# Patient Record
Sex: MALE | Race: WHITE | URBAN - METROPOLITAN AREA
[De-identification: names, ages, dates, MRNs, and addresses within clinical notes are randomized per-mention and may not be internally consistent; named-entity substitution may affect disease eponyms.]

---

## 2021-08-05 ENCOUNTER — OFFICE VISIT (OUTPATIENT)
Dept: URBAN - METROPOLITAN AREA CLINIC 3 | Facility: CLINIC | Age: 56
End: 2021-08-05
Payer: COMMERCIAL

## 2021-08-05 DIAGNOSIS — H53.8 OTHER VISUAL DISTURBANCES: ICD-10-CM

## 2021-08-05 DIAGNOSIS — H25.813 COMBINED FORMS OF AGE-RELATED CATARACT, BILATERAL: ICD-10-CM

## 2021-08-05 DIAGNOSIS — S06.2X9A: ICD-10-CM

## 2021-08-05 PROCEDURE — 99204 OFFICE O/P NEW MOD 45 MIN: CPT | Performed by: OPTOMETRIST

## 2021-08-05 ASSESSMENT — INTRAOCULAR PRESSURE
OD: 8
OS: 11

## 2021-08-05 NOTE — IMPRESSION/PLAN
Impression: Combined forms of age-related cataract, bilateral: H25.813. Plan: Observe for now without intervention. The patient was advised to 
contact us if any change or worsening of vision.

## 2021-08-05 NOTE — IMPRESSION/PLAN
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 2x9A. Plan: Continue follow-up with neurology and rehab therapy as scheduled.

## 2021-08-05 NOTE — IMPRESSION/PLAN
Impression: Dry eye syndrome of bilateral lacrimal glands: H04.123. Plan: Recommend continued use of artificial tears 3-4 x daily and 
lubricant ointment or gel at bedtime.

## 2021-08-05 NOTE — IMPRESSION/PLAN
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 Plan: Recommend continued use of artificial tears 3-4 x daily and 
lubricant ointment or gel at bedtime. This condition is secondary to Traumatic Brain Injury.

## 2021-08-05 NOTE — IMPRESSION/PLAN
Impression: Other visual disturbances: H53.8. Plan: Will need refraction as visual disturbances are a result of traumatic brain injury. Will perform refraction in two week to allow for dry therapy success.

## 2021-08-19 ENCOUNTER — POST-OPERATIVE VISIT (OUTPATIENT)
Dept: URBAN - METROPOLITAN AREA CLINIC 3 | Facility: CLINIC | Age: 56
End: 2021-08-19
Payer: COMMERCIAL

## 2021-08-19 DIAGNOSIS — H04.123 DRY EYE SYNDROME OF BILATERAL LACRIMAL GLANDS: Primary | ICD-10-CM

## 2021-08-19 DIAGNOSIS — H02.215: ICD-10-CM

## 2021-08-19 DIAGNOSIS — H52.03 HYPERMETROPIA, BILATERAL: ICD-10-CM

## 2021-08-19 PROCEDURE — 92012 INTRM OPH EXAM EST PATIENT: CPT | Performed by: OPTOMETRIST

## 2021-08-19 ASSESSMENT — VISUAL ACUITY
OS: 20/80
OD: 20/80

## 2021-08-19 NOTE — IMPRESSION/PLAN
Impression: Hypermetropia, bilateral: H52.03. Plan: Glasses prescription issued. Patient educated about adaptation to new lenses.

## 2021-08-19 NOTE — IMPRESSION/PLAN
Impression: Dry eye syndrome of bilateral lacrimal glands: H04.123. Plan: Dry eyes account for the patient's complaints. There is no evidence of permanent changes to the cornea. Explained condition does not have a cure and will need artificial tears QID OU and Systane gel at bedtime for maintenance.